# Patient Record
Sex: FEMALE | Race: BLACK OR AFRICAN AMERICAN | NOT HISPANIC OR LATINO | Employment: UNEMPLOYED | URBAN - METROPOLITAN AREA
[De-identification: names, ages, dates, MRNs, and addresses within clinical notes are randomized per-mention and may not be internally consistent; named-entity substitution may affect disease eponyms.]

---

## 2021-03-04 PROBLEM — E11.9 TYPE 2 DIABETES MELLITUS WITHOUT COMPLICATION, WITHOUT LONG-TERM CURRENT USE OF INSULIN: Status: ACTIVE | Noted: 2021-03-04

## 2021-03-04 PROBLEM — Z00.00 PREVENTATIVE HEALTH CARE: Status: ACTIVE | Noted: 2021-03-04

## 2021-03-04 PROBLEM — F25.9 SCHIZO-AFFECTIVE SCHIZOPHRENIA: Status: ACTIVE | Noted: 2021-03-04

## 2021-03-04 PROBLEM — N92.0 MENORRHAGIA WITH REGULAR CYCLE: Status: ACTIVE | Noted: 2021-03-04

## 2021-06-07 PROBLEM — Z00.00 PREVENTATIVE HEALTH CARE: Status: RESOLVED | Noted: 2021-03-04 | Resolved: 2021-06-07

## 2021-11-04 ENCOUNTER — PATIENT OUTREACH (OUTPATIENT)
Dept: ADMINISTRATIVE | Facility: HOSPITAL | Age: 51
End: 2021-11-04

## 2021-11-04 DIAGNOSIS — Z13.6 ENCOUNTER FOR LIPID SCREENING FOR CARDIOVASCULAR DISEASE: Primary | ICD-10-CM

## 2021-11-04 DIAGNOSIS — Z13.220 ENCOUNTER FOR LIPID SCREENING FOR CARDIOVASCULAR DISEASE: Primary | ICD-10-CM

## 2022-03-18 PROBLEM — E78.5 DYSLIPIDEMIA: Status: ACTIVE | Noted: 2022-03-18

## 2022-03-18 PROBLEM — I10 PRIMARY HYPERTENSION: Status: ACTIVE | Noted: 2022-03-18

## 2022-09-22 PROBLEM — G47.00 INSOMNIA: Status: ACTIVE | Noted: 2022-09-22

## 2022-09-22 PROBLEM — Z23 PNEUMOCOCCAL VACCINATION ADMINISTERED AT CURRENT VISIT: Status: ACTIVE | Noted: 2022-09-22

## 2022-09-22 PROBLEM — Z23 NEED FOR SHINGLES VACCINE: Status: ACTIVE | Noted: 2022-09-22

## 2022-09-22 PROBLEM — Z23 INFLUENZA VACCINATION ADMINISTERED AT CURRENT VISIT: Status: ACTIVE | Noted: 2022-09-22

## 2022-09-22 PROBLEM — Z12.31 ENCOUNTER FOR MAMMOGRAM TO ESTABLISH BASELINE MAMMOGRAM: Status: ACTIVE | Noted: 2022-09-22

## 2022-09-22 PROBLEM — Z12.11 ENCOUNTER FOR SCREENING COLONOSCOPY: Status: ACTIVE | Noted: 2022-09-22

## 2022-10-18 PROBLEM — Z12.11 SCREEN FOR COLON CANCER: Status: ACTIVE | Noted: 2022-10-18

## 2023-01-10 PROBLEM — N39.3 STRESS INCONTINENCE OF URINE: Status: ACTIVE | Noted: 2023-01-10

## 2023-03-23 ENCOUNTER — PATIENT OUTREACH (OUTPATIENT)
Dept: ADMINISTRATIVE | Facility: HOSPITAL | Age: 53
End: 2023-03-23

## 2023-04-29 PROBLEM — Z86.718 HISTORY OF BLOOD CLOTS: Status: ACTIVE | Noted: 2023-04-29

## 2023-04-29 PROBLEM — F41.9 ANXIETY: Status: ACTIVE | Noted: 2023-04-29

## 2023-04-29 PROBLEM — Z00.00 HEALTHCARE MAINTENANCE: Status: ACTIVE | Noted: 2023-04-29

## 2023-04-29 PROBLEM — I10 HYPERTENSION: Status: ACTIVE | Noted: 2023-04-29

## 2023-06-06 PROBLEM — R63.0 LOSS OF APPETITE: Status: ACTIVE | Noted: 2023-06-06

## 2023-06-06 PROBLEM — K59.00 CONSTIPATION: Status: ACTIVE | Noted: 2023-06-06

## 2023-07-31 PROBLEM — Z00.00 HEALTHCARE MAINTENANCE: Status: RESOLVED | Noted: 2023-04-29 | Resolved: 2023-07-31

## 2023-09-29 ENCOUNTER — PATIENT OUTREACH (OUTPATIENT)
Dept: ADMINISTRATIVE | Facility: HOSPITAL | Age: 53
End: 2023-09-29

## 2023-09-29 DIAGNOSIS — E11.9 TYPE 2 DIABETES MELLITUS WITHOUT COMPLICATION, WITHOUT LONG-TERM CURRENT USE OF INSULIN: Primary | ICD-10-CM

## 2023-10-12 ENCOUNTER — PATIENT OUTREACH (OUTPATIENT)
Dept: ADMINISTRATIVE | Facility: HOSPITAL | Age: 53
End: 2023-10-12

## 2023-11-02 ENCOUNTER — TELEPHONE (OUTPATIENT)
Dept: ADMINISTRATIVE | Facility: HOSPITAL | Age: 53
End: 2023-11-02

## 2023-12-07 ENCOUNTER — TELEPHONE (OUTPATIENT)
Dept: ADMINISTRATIVE | Facility: HOSPITAL | Age: 53
End: 2023-12-07

## 2024-11-21 ENCOUNTER — OUTPATIENT CASE MANAGEMENT (OUTPATIENT)
Dept: ADMINISTRATIVE | Facility: OTHER | Age: 54
End: 2024-11-21

## 2024-11-21 NOTE — PROGRESS NOTES
[facility] had received a consult to see Patient for medicine management. However, Patient is currently receiving mental health care from [facility]. Patient is encouraged to continue receiving care from [facility].    Tasneem Campos LCSW    [phone]